# Patient Record
Sex: FEMALE | Race: WHITE | NOT HISPANIC OR LATINO | Employment: FULL TIME | URBAN - METROPOLITAN AREA
[De-identification: names, ages, dates, MRNs, and addresses within clinical notes are randomized per-mention and may not be internally consistent; named-entity substitution may affect disease eponyms.]

---

## 2018-08-26 ENCOUNTER — HOSPITAL ENCOUNTER (INPATIENT)
Facility: HOSPITAL | Age: 63
LOS: 5 days | Discharge: HOME | End: 2018-08-31
Attending: SPECIALIST | Admitting: SPECIALIST

## 2018-08-26 ENCOUNTER — APPOINTMENT (OUTPATIENT)
Dept: GENERAL RADIOLOGY | Facility: HOSPITAL | Age: 63
End: 2018-08-26

## 2018-08-26 ENCOUNTER — HOSPITAL ENCOUNTER (EMERGENCY)
Facility: HOSPITAL | Age: 63
Discharge: PSYCHIATRIC OR SUBSTANCE USE FACILITY | End: 2018-08-26
Attending: EMERGENCY MEDICINE | Admitting: EMERGENCY MEDICINE

## 2018-08-26 ENCOUNTER — APPOINTMENT (OUTPATIENT)
Dept: CT IMAGING | Facility: HOSPITAL | Age: 63
End: 2018-08-26

## 2018-08-26 VITALS
DIASTOLIC BLOOD PRESSURE: 84 MMHG | OXYGEN SATURATION: 98 % | TEMPERATURE: 97.7 F | RESPIRATION RATE: 18 BRPM | SYSTOLIC BLOOD PRESSURE: 136 MMHG | HEART RATE: 83 BPM

## 2018-08-26 DIAGNOSIS — T50.902A INTENTIONAL DRUG OVERDOSE, INITIAL ENCOUNTER (HCC): ICD-10-CM

## 2018-08-26 DIAGNOSIS — S00.83XA CONTUSION OF FACE, INITIAL ENCOUNTER: ICD-10-CM

## 2018-08-26 DIAGNOSIS — S80.02XA CONTUSION OF LEFT KNEE, INITIAL ENCOUNTER: ICD-10-CM

## 2018-08-26 DIAGNOSIS — S63.502A SPRAIN OF LEFT WRIST, INITIAL ENCOUNTER: ICD-10-CM

## 2018-08-26 DIAGNOSIS — S80.01XA CONTUSION OF RIGHT KNEE, INITIAL ENCOUNTER: ICD-10-CM

## 2018-08-26 DIAGNOSIS — R45.851 SUICIDAL IDEATION: Primary | ICD-10-CM

## 2018-08-26 PROBLEM — T14.91XA SUICIDE ATTEMPT (HCC): Status: ACTIVE | Noted: 2018-08-26

## 2018-08-26 LAB
ALBUMIN SERPL-MCNC: 4 G/DL (ref 3.5–5.2)
ALBUMIN/GLOB SERPL: 1.6 G/DL
ALP SERPL-CCNC: 115 U/L (ref 39–117)
ALT SERPL W P-5'-P-CCNC: 12 U/L (ref 1–33)
AMPHET+METHAMPHET UR QL: POSITIVE
ANION GAP SERPL CALCULATED.3IONS-SCNC: 12.9 MMOL/L
APAP SERPL-MCNC: <5 MCG/ML (ref 10–30)
AST SERPL-CCNC: 12 U/L (ref 1–32)
BARBITURATES UR QL SCN: NEGATIVE
BASOPHILS # BLD AUTO: 0.01 10*3/MM3 (ref 0–0.2)
BASOPHILS NFR BLD AUTO: 0.1 % (ref 0–1.5)
BENZODIAZ UR QL SCN: POSITIVE
BILIRUB SERPL-MCNC: 1 MG/DL (ref 0.1–1.2)
BUN BLD-MCNC: 15 MG/DL (ref 8–23)
BUN/CREAT SERPL: 15 (ref 7–25)
CALCIUM SPEC-SCNC: 8.7 MG/DL (ref 8.6–10.5)
CANNABINOIDS SERPL QL: NEGATIVE
CHLORIDE SERPL-SCNC: 105 MMOL/L (ref 98–107)
CK SERPL-CCNC: 132 U/L (ref 20–180)
CO2 SERPL-SCNC: 26.1 MMOL/L (ref 22–29)
COCAINE UR QL: NEGATIVE
CREAT BLD-MCNC: 1 MG/DL (ref 0.57–1)
DEPRECATED RDW RBC AUTO: 42.2 FL (ref 37–54)
EOSINOPHIL # BLD AUTO: 0.04 10*3/MM3 (ref 0–0.7)
EOSINOPHIL NFR BLD AUTO: 0.5 % (ref 0.3–6.2)
ERYTHROCYTE [DISTWIDTH] IN BLOOD BY AUTOMATED COUNT: 14.6 % (ref 11.7–13)
ETHANOL BLD-MCNC: <10 MG/DL (ref 0–10)
ETHANOL UR QL: <0.01 %
GFR SERPL CREATININE-BSD FRML MDRD: 56 ML/MIN/1.73
GFR SERPL CREATININE-BSD FRML MDRD: 68 ML/MIN/1.73
GLOBULIN UR ELPH-MCNC: 2.5 GM/DL
GLUCOSE BLD-MCNC: 104 MG/DL (ref 65–99)
HCT VFR BLD AUTO: 43.2 % (ref 35.6–45.5)
HGB BLD-MCNC: 13.3 G/DL (ref 11.9–15.5)
IMM GRANULOCYTES # BLD: 0.02 10*3/MM3 (ref 0–0.03)
IMM GRANULOCYTES NFR BLD: 0.2 % (ref 0–0.5)
LYMPHOCYTES # BLD AUTO: 1.66 10*3/MM3 (ref 0.9–4.8)
LYMPHOCYTES NFR BLD AUTO: 18.9 % (ref 19.6–45.3)
MCH RBC QN AUTO: 24.6 PG (ref 26.9–32)
MCHC RBC AUTO-ENTMCNC: 30.8 G/DL (ref 32.4–36.3)
MCV RBC AUTO: 79.9 FL (ref 80.5–98.2)
METHADONE UR QL SCN: POSITIVE
MONOCYTES # BLD AUTO: 0.73 10*3/MM3 (ref 0.2–1.2)
MONOCYTES NFR BLD AUTO: 8.3 % (ref 5–12)
NEUTROPHILS # BLD AUTO: 6.36 10*3/MM3 (ref 1.9–8.1)
NEUTROPHILS NFR BLD AUTO: 72.2 % (ref 42.7–76)
NRBC BLD MANUAL-RTO: 0 /100 WBC (ref 0–0)
OPIATES UR QL: NEGATIVE
OXYCODONE UR QL SCN: NEGATIVE
PLATELET # BLD AUTO: 334 10*3/MM3 (ref 140–500)
PMV BLD AUTO: 11.2 FL (ref 6–12)
POTASSIUM BLD-SCNC: 3.3 MMOL/L (ref 3.5–5.2)
PROT SERPL-MCNC: 6.5 G/DL (ref 6–8.5)
RBC # BLD AUTO: 5.41 10*6/MM3 (ref 3.9–5.2)
SALICYLATES SERPL-MCNC: <0.3 MG/DL
SODIUM BLD-SCNC: 144 MMOL/L (ref 136–145)
WBC NRBC COR # BLD: 8.8 10*3/MM3 (ref 4.5–10.7)

## 2018-08-26 PROCEDURE — 80307 DRUG TEST PRSMV CHEM ANLYZR: CPT | Performed by: EMERGENCY MEDICINE

## 2018-08-26 PROCEDURE — 90791 PSYCH DIAGNOSTIC EVALUATION: CPT

## 2018-08-26 PROCEDURE — 82550 ASSAY OF CK (CPK): CPT | Performed by: EMERGENCY MEDICINE

## 2018-08-26 PROCEDURE — 73110 X-RAY EXAM OF WRIST: CPT

## 2018-08-26 PROCEDURE — 70450 CT HEAD/BRAIN W/O DYE: CPT

## 2018-08-26 PROCEDURE — 93010 ELECTROCARDIOGRAM REPORT: CPT | Performed by: INTERNAL MEDICINE

## 2018-08-26 PROCEDURE — 70486 CT MAXILLOFACIAL W/O DYE: CPT

## 2018-08-26 PROCEDURE — 85025 COMPLETE CBC W/AUTO DIFF WBC: CPT | Performed by: EMERGENCY MEDICINE

## 2018-08-26 PROCEDURE — 73562 X-RAY EXAM OF KNEE 3: CPT

## 2018-08-26 PROCEDURE — 93005 ELECTROCARDIOGRAM TRACING: CPT | Performed by: EMERGENCY MEDICINE

## 2018-08-26 PROCEDURE — 99284 EMERGENCY DEPT VISIT MOD MDM: CPT

## 2018-08-26 PROCEDURE — 80053 COMPREHEN METABOLIC PANEL: CPT | Performed by: EMERGENCY MEDICINE

## 2018-08-26 RX ORDER — DEXTROAMPHETAMINE SACCHARATE, AMPHETAMINE ASPARTATE, DEXTROAMPHETAMINE SULFATE AND AMPHETAMINE SULFATE 5; 5; 5; 5 MG/1; MG/1; MG/1; MG/1
30 TABLET ORAL DAILY
Status: ON HOLD | COMMUNITY
End: 2018-08-27

## 2018-08-26 RX ORDER — ROPINIROLE 4 MG/1
4 TABLET, FILM COATED ORAL NIGHTLY
COMMUNITY

## 2018-08-26 RX ORDER — ALUMINA, MAGNESIA, AND SIMETHICONE 2400; 2400; 240 MG/30ML; MG/30ML; MG/30ML
15 SUSPENSION ORAL EVERY 6 HOURS PRN
Status: DISCONTINUED | OUTPATIENT
Start: 2018-08-26 | End: 2018-08-31 | Stop reason: HOSPADM

## 2018-08-26 RX ORDER — BUPROPION HYDROCHLORIDE 150 MG/1
300 TABLET ORAL EVERY MORNING
COMMUNITY
End: 2018-08-31 | Stop reason: HOSPADM

## 2018-08-26 RX ORDER — ACETAMINOPHEN 325 MG/1
650 TABLET ORAL EVERY 4 HOURS PRN
Status: DISCONTINUED | OUTPATIENT
Start: 2018-08-26 | End: 2018-08-31 | Stop reason: HOSPADM

## 2018-08-26 RX ORDER — CLONAZEPAM 0.5 MG/1
0.5 TABLET ORAL 2 TIMES DAILY PRN
COMMUNITY
End: 2018-08-31 | Stop reason: HOSPADM

## 2018-08-26 RX ORDER — BUSPIRONE HYDROCHLORIDE 10 MG/1
20 TABLET ORAL 2 TIMES DAILY
COMMUNITY
End: 2018-08-31 | Stop reason: HOSPADM

## 2018-08-26 RX ORDER — LEVOTHYROXINE SODIUM 0.07 MG/1
75 TABLET ORAL DAILY
COMMUNITY

## 2018-08-26 RX ORDER — ZOLPIDEM TARTRATE 12.5 MG/1
12.5 TABLET, FILM COATED, EXTENDED RELEASE ORAL NIGHTLY
COMMUNITY

## 2018-08-27 LAB
CHOLEST SERPL-MCNC: 148 MG/DL (ref 0–200)
HDLC SERPL-MCNC: 63 MG/DL (ref 40–60)
LDLC SERPL CALC-MCNC: 66 MG/DL (ref 0–100)
LDLC/HDLC SERPL: 1.05 {RATIO}
T4 FREE SERPL-MCNC: 1.38 NG/DL (ref 0.93–1.7)
TRIGL SERPL-MCNC: 94 MG/DL (ref 0–150)
TSH SERPL DL<=0.05 MIU/L-ACNC: 6.52 MIU/ML (ref 0.27–4.2)
VLDLC SERPL-MCNC: 18.8 MG/DL (ref 5–40)

## 2018-08-27 PROCEDURE — 84439 ASSAY OF FREE THYROXINE: CPT | Performed by: SPECIALIST

## 2018-08-27 PROCEDURE — 84443 ASSAY THYROID STIM HORMONE: CPT | Performed by: SPECIALIST

## 2018-08-27 PROCEDURE — 80061 LIPID PANEL: CPT | Performed by: SPECIALIST

## 2018-08-27 RX ORDER — MELATONIN
2000 DAILY
Status: DISCONTINUED | OUTPATIENT
Start: 2018-08-27 | End: 2018-08-31 | Stop reason: HOSPADM

## 2018-08-27 RX ORDER — DEXTROAMPHETAMINE SACCHARATE, AMPHETAMINE ASPARTATE MONOHYDRATE, DEXTROAMPHETAMINE SULFATE AND AMPHETAMINE SULFATE 2.5; 2.5; 2.5; 2.5 MG/1; MG/1; MG/1; MG/1
30 CAPSULE, EXTENDED RELEASE ORAL DAILY
Status: DISCONTINUED | OUTPATIENT
Start: 2018-08-27 | End: 2018-08-27 | Stop reason: DRUGHIGH

## 2018-08-27 RX ORDER — BUSPIRONE HYDROCHLORIDE 10 MG/1
20 TABLET ORAL 2 TIMES DAILY
Status: DISCONTINUED | OUTPATIENT
Start: 2018-08-27 | End: 2018-08-31 | Stop reason: HOSPADM

## 2018-08-27 RX ORDER — DEXTROAMPHETAMINE SACCHARATE, AMPHETAMINE ASPARTATE, DEXTROAMPHETAMINE SULFATE AND AMPHETAMINE SULFATE 1.25; 1.25; 1.25; 1.25 MG/1; MG/1; MG/1; MG/1
20 TABLET ORAL
Status: DISCONTINUED | OUTPATIENT
Start: 2018-08-27 | End: 2018-08-28

## 2018-08-27 RX ORDER — CLONAZEPAM 0.5 MG/1
0.5 TABLET ORAL 2 TIMES DAILY PRN
Status: DISCONTINUED | OUTPATIENT
Start: 2018-08-27 | End: 2018-08-31 | Stop reason: HOSPADM

## 2018-08-27 RX ORDER — ROPINIROLE 2 MG/1
4 TABLET, FILM COATED ORAL NIGHTLY
Status: DISCONTINUED | OUTPATIENT
Start: 2018-08-27 | End: 2018-08-31 | Stop reason: HOSPADM

## 2018-08-27 RX ORDER — LEVOTHYROXINE SODIUM 0.07 MG/1
75 TABLET ORAL
Status: DISCONTINUED | OUTPATIENT
Start: 2018-08-27 | End: 2018-08-31 | Stop reason: HOSPADM

## 2018-08-27 RX ORDER — DEXTROAMPHETAMINE SACCHARATE, AMPHETAMINE ASPARTATE MONOHYDRATE, DEXTROAMPHETAMINE SULFATE AND AMPHETAMINE SULFATE 5; 5; 5; 5 MG/1; MG/1; MG/1; MG/1
40 CAPSULE, EXTENDED RELEASE ORAL EVERY MORNING
COMMUNITY

## 2018-08-27 RX ORDER — ZOLPIDEM TARTRATE 5 MG/1
5 TABLET ORAL NIGHTLY
Status: DISCONTINUED | OUTPATIENT
Start: 2018-08-27 | End: 2018-08-28

## 2018-08-27 RX ORDER — BUPROPION HYDROCHLORIDE 150 MG/1
150 TABLET ORAL 3 TIMES DAILY
Status: DISCONTINUED | OUTPATIENT
Start: 2018-08-27 | End: 2018-08-27

## 2018-08-27 RX ADMIN — VORTIOXETINE 20 MG: 10 TABLET, FILM COATED ORAL at 14:11

## 2018-08-27 RX ADMIN — BUPROPION HYDROCHLORIDE 150 MG: 150 TABLET, FILM COATED, EXTENDED RELEASE ORAL at 08:47

## 2018-08-27 RX ADMIN — LEVOTHYROXINE SODIUM 75 MCG: 75 TABLET ORAL at 08:47

## 2018-08-27 RX ADMIN — VITAMIN D, TAB 1000IU (100/BT) 2000 UNITS: 25 TAB at 08:46

## 2018-08-27 RX ADMIN — CLONAZEPAM 0.5 MG: 0.5 TABLET ORAL at 22:13

## 2018-08-27 RX ADMIN — BUSPIRONE HYDROCHLORIDE 20 MG: 10 TABLET ORAL at 08:46

## 2018-08-27 RX ADMIN — ACETAMINOPHEN 650 MG: 325 TABLET ORAL at 04:11

## 2018-08-27 RX ADMIN — ACETAMINOPHEN 650 MG: 325 TABLET ORAL at 22:14

## 2018-08-27 RX ADMIN — DEXTROAMPHETAMINE SACCHARATE, AMPHETAMINE ASPARTATE, DEXTROAMPHETAMINE SULFATE AND AMPHETAMINE SULFATE 20 MG: 1.25; 1.25; 1.25; 1.25 TABLET ORAL at 14:11

## 2018-08-27 RX ADMIN — BUSPIRONE HYDROCHLORIDE 20 MG: 10 TABLET ORAL at 22:12

## 2018-08-27 RX ADMIN — CLONAZEPAM 0.5 MG: 0.5 TABLET ORAL at 04:12

## 2018-08-27 RX ADMIN — ROPINIROLE 4 MG: 2 TABLET, FILM COATED ORAL at 22:13

## 2018-08-27 RX ADMIN — ZOLPIDEM TARTRATE 5 MG: 5 TABLET ORAL at 22:13

## 2018-08-28 LAB
BACTERIA UR QL AUTO: ABNORMAL /HPF
BILIRUB UR QL STRIP: NEGATIVE
CLARITY UR: CLEAR
COLOR UR: YELLOW
GLUCOSE UR STRIP-MCNC: NEGATIVE MG/DL
HGB UR QL STRIP.AUTO: NEGATIVE
HYALINE CASTS UR QL AUTO: ABNORMAL /LPF
KETONES UR QL STRIP: NEGATIVE
LEUKOCYTE ESTERASE UR QL STRIP.AUTO: ABNORMAL
NITRITE UR QL STRIP: NEGATIVE
PH UR STRIP.AUTO: 5.5 [PH] (ref 5–8)
PROT UR QL STRIP: NEGATIVE
RBC # UR: ABNORMAL /HPF
REF LAB TEST METHOD: ABNORMAL
SP GR UR STRIP: 1.01 (ref 1–1.03)
SQUAMOUS #/AREA URNS HPF: ABNORMAL /HPF
UROBILINOGEN UR QL STRIP: ABNORMAL
WBC UR QL AUTO: ABNORMAL /HPF

## 2018-08-28 PROCEDURE — 81001 URINALYSIS AUTO W/SCOPE: CPT | Performed by: SPECIALIST

## 2018-08-28 RX ORDER — NAPROXEN 250 MG/1
375 TABLET ORAL 2 TIMES DAILY WITH MEALS
Status: DISCONTINUED | OUTPATIENT
Start: 2018-08-28 | End: 2018-08-29

## 2018-08-28 RX ORDER — ZOLPIDEM TARTRATE 5 MG/1
10 TABLET ORAL NIGHTLY
Status: DISCONTINUED | OUTPATIENT
Start: 2018-08-28 | End: 2018-08-31 | Stop reason: HOSPADM

## 2018-08-28 RX ORDER — DEXTROAMPHETAMINE SACCHARATE, AMPHETAMINE ASPARTATE MONOHYDRATE, DEXTROAMPHETAMINE SULFATE AND AMPHETAMINE SULFATE 5; 5; 5; 5 MG/1; MG/1; MG/1; MG/1
40 CAPSULE, EXTENDED RELEASE ORAL DAILY
Status: DISCONTINUED | OUTPATIENT
Start: 2018-08-29 | End: 2018-08-31 | Stop reason: HOSPADM

## 2018-08-28 RX ADMIN — NAPROXEN 375 MG: 250 TABLET ORAL at 12:16

## 2018-08-28 RX ADMIN — LEVOTHYROXINE SODIUM 75 MCG: 75 TABLET ORAL at 08:09

## 2018-08-28 RX ADMIN — ZOLPIDEM TARTRATE 10 MG: 5 TABLET ORAL at 22:36

## 2018-08-28 RX ADMIN — ACETAMINOPHEN 650 MG: 325 TABLET ORAL at 15:35

## 2018-08-28 RX ADMIN — BUPROPION HYDROCHLORIDE 450 MG: 300 TABLET, EXTENDED RELEASE ORAL at 08:09

## 2018-08-28 RX ADMIN — ACETAMINOPHEN 650 MG: 325 TABLET ORAL at 02:15

## 2018-08-28 RX ADMIN — BUSPIRONE HYDROCHLORIDE 20 MG: 10 TABLET ORAL at 08:09

## 2018-08-28 RX ADMIN — BUSPIRONE HYDROCHLORIDE 20 MG: 10 TABLET ORAL at 22:36

## 2018-08-28 RX ADMIN — ROPINIROLE 4 MG: 2 TABLET, FILM COATED ORAL at 22:36

## 2018-08-28 RX ADMIN — VITAMIN D, TAB 1000IU (100/BT) 2000 UNITS: 25 TAB at 08:09

## 2018-08-28 RX ADMIN — NAPROXEN 375 MG: 250 TABLET ORAL at 17:09

## 2018-08-28 RX ADMIN — CLONAZEPAM 0.5 MG: 0.5 TABLET ORAL at 05:08

## 2018-08-28 RX ADMIN — VORTIOXETINE 20 MG: 10 TABLET, FILM COATED ORAL at 08:09

## 2018-08-29 RX ORDER — IBUPROFEN 400 MG/1
400 TABLET ORAL EVERY 4 HOURS PRN
Status: DISCONTINUED | OUTPATIENT
Start: 2018-08-29 | End: 2018-08-31 | Stop reason: HOSPADM

## 2018-08-29 RX ADMIN — VITAMIN D, TAB 1000IU (100/BT) 2000 UNITS: 25 TAB at 08:31

## 2018-08-29 RX ADMIN — BUSPIRONE HYDROCHLORIDE 20 MG: 10 TABLET ORAL at 21:15

## 2018-08-29 RX ADMIN — NAPROXEN 375 MG: 250 TABLET ORAL at 08:31

## 2018-08-29 RX ADMIN — ZOLPIDEM TARTRATE 10 MG: 5 TABLET ORAL at 21:15

## 2018-08-29 RX ADMIN — VORTIOXETINE 20 MG: 10 TABLET, FILM COATED ORAL at 08:31

## 2018-08-29 RX ADMIN — ROPINIROLE 4 MG: 2 TABLET, FILM COATED ORAL at 21:15

## 2018-08-29 RX ADMIN — ACETAMINOPHEN 650 MG: 325 TABLET ORAL at 04:05

## 2018-08-29 RX ADMIN — BUSPIRONE HYDROCHLORIDE 20 MG: 10 TABLET ORAL at 08:32

## 2018-08-29 RX ADMIN — DEXTROAMPHETAMINE SACCHARATE, AMPHETAMINE ASPARTATE MONOHYDRATE, DEXTROAMPHETAMINE SULFATE AND AMPHETAMINE SULFATE 40 MG: 5; 5; 5; 5 CAPSULE, EXTENDED RELEASE ORAL at 08:31

## 2018-08-29 RX ADMIN — CLONAZEPAM 0.5 MG: 0.5 TABLET ORAL at 21:26

## 2018-08-29 RX ADMIN — BUPROPION HYDROCHLORIDE 450 MG: 300 TABLET, EXTENDED RELEASE ORAL at 08:31

## 2018-08-29 RX ADMIN — LEVOTHYROXINE SODIUM 75 MCG: 75 TABLET ORAL at 10:57

## 2018-08-30 LAB — TROPONIN T SERPL-MCNC: <0.01 NG/ML (ref 0–0.03)

## 2018-08-30 PROCEDURE — 84484 ASSAY OF TROPONIN QUANT: CPT | Performed by: SPECIALIST

## 2018-08-30 PROCEDURE — 93010 ELECTROCARDIOGRAM REPORT: CPT | Performed by: INTERNAL MEDICINE

## 2018-08-30 PROCEDURE — 93005 ELECTROCARDIOGRAM TRACING: CPT | Performed by: SPECIALIST

## 2018-08-30 RX ORDER — DIPHENOXYLATE HYDROCHLORIDE AND ATROPINE SULFATE 2.5; .025 MG/1; MG/1
1 TABLET ORAL DAILY
Status: DISCONTINUED | OUTPATIENT
Start: 2018-08-30 | End: 2018-08-31 | Stop reason: HOSPADM

## 2018-08-30 RX ADMIN — DEXTROAMPHETAMINE SACCHARATE, AMPHETAMINE ASPARTATE MONOHYDRATE, DEXTROAMPHETAMINE SULFATE AND AMPHETAMINE SULFATE 40 MG: 5; 5; 5; 5 CAPSULE, EXTENDED RELEASE ORAL at 09:02

## 2018-08-30 RX ADMIN — IBUPROFEN 400 MG: 400 TABLET ORAL at 09:07

## 2018-08-30 RX ADMIN — BUPROPION HYDROCHLORIDE 450 MG: 300 TABLET, EXTENDED RELEASE ORAL at 09:03

## 2018-08-30 RX ADMIN — IBUPROFEN 400 MG: 400 TABLET ORAL at 21:38

## 2018-08-30 RX ADMIN — CLONAZEPAM 0.5 MG: 0.5 TABLET ORAL at 21:44

## 2018-08-30 RX ADMIN — VORTIOXETINE 20 MG: 10 TABLET, FILM COATED ORAL at 09:03

## 2018-08-30 RX ADMIN — BUSPIRONE HYDROCHLORIDE 20 MG: 10 TABLET ORAL at 09:03

## 2018-08-30 RX ADMIN — VITAMIN D, TAB 1000IU (100/BT) 2000 UNITS: 25 TAB at 09:03

## 2018-08-30 RX ADMIN — ACETAMINOPHEN 650 MG: 325 TABLET ORAL at 00:13

## 2018-08-30 RX ADMIN — ROPINIROLE 4 MG: 2 TABLET, FILM COATED ORAL at 21:38

## 2018-08-30 RX ADMIN — IBUPROFEN 400 MG: 400 TABLET ORAL at 13:41

## 2018-08-30 RX ADMIN — ZOLPIDEM TARTRATE 10 MG: 5 TABLET ORAL at 21:38

## 2018-08-30 RX ADMIN — CLONAZEPAM 0.5 MG: 0.5 TABLET ORAL at 09:08

## 2018-08-30 RX ADMIN — BUSPIRONE HYDROCHLORIDE 20 MG: 10 TABLET ORAL at 21:38

## 2018-08-30 RX ADMIN — LEVOTHYROXINE SODIUM 75 MCG: 75 TABLET ORAL at 11:00

## 2018-08-30 RX ADMIN — Medication 1 TABLET: at 12:43

## 2018-08-31 VITALS
HEART RATE: 97 BPM | DIASTOLIC BLOOD PRESSURE: 85 MMHG | OXYGEN SATURATION: 95 % | TEMPERATURE: 97.5 F | RESPIRATION RATE: 18 BRPM | SYSTOLIC BLOOD PRESSURE: 155 MMHG

## 2018-08-31 PROBLEM — R07.9 CHEST PAIN: Status: ACTIVE | Noted: 2018-08-31

## 2018-08-31 LAB — TROPONIN T SERPL-MCNC: <0.01 NG/ML (ref 0–0.03)

## 2018-08-31 PROCEDURE — 84484 ASSAY OF TROPONIN QUANT: CPT | Performed by: HOSPITALIST

## 2018-08-31 RX ORDER — CLONAZEPAM 0.5 MG/1
0.5 TABLET ORAL 2 TIMES DAILY PRN
Qty: 60 TABLET | Refills: 1 | Status: SHIPPED | OUTPATIENT
Start: 2018-08-31 | End: 2018-09-06

## 2018-08-31 RX ORDER — BUPROPION HYDROCHLORIDE 450 MG/1
450 TABLET, FILM COATED, EXTENDED RELEASE ORAL DAILY
Qty: 30 TABLET | Refills: 1 | Status: SHIPPED | OUTPATIENT
Start: 2018-09-01

## 2018-08-31 RX ORDER — BUSPIRONE HYDROCHLORIDE 10 MG/1
20 TABLET ORAL 2 TIMES DAILY
Qty: 60 TABLET | Refills: 1 | Status: SHIPPED | OUTPATIENT
Start: 2018-08-31

## 2018-08-31 RX ADMIN — VORTIOXETINE 20 MG: 10 TABLET, FILM COATED ORAL at 08:33

## 2018-08-31 RX ADMIN — BUPROPION HYDROCHLORIDE 450 MG: 300 TABLET, EXTENDED RELEASE ORAL at 08:33

## 2018-08-31 RX ADMIN — Medication 1 TABLET: at 08:33

## 2018-08-31 RX ADMIN — CLONAZEPAM 0.5 MG: 0.5 TABLET ORAL at 03:23

## 2018-08-31 RX ADMIN — BUSPIRONE HYDROCHLORIDE 20 MG: 10 TABLET ORAL at 08:33

## 2018-08-31 RX ADMIN — VITAMIN D, TAB 1000IU (100/BT) 2000 UNITS: 25 TAB at 08:33

## 2018-08-31 RX ADMIN — ACETAMINOPHEN 650 MG: 325 TABLET ORAL at 03:23

## 2018-08-31 RX ADMIN — DEXTROAMPHETAMINE SACCHARATE, AMPHETAMINE ASPARTATE MONOHYDRATE, DEXTROAMPHETAMINE SULFATE AND AMPHETAMINE SULFATE 40 MG: 5; 5; 5; 5 CAPSULE, EXTENDED RELEASE ORAL at 11:06

## 2018-09-04 ENCOUNTER — OFFICE VISIT (OUTPATIENT)
Dept: PSYCHIATRY | Facility: HOSPITAL | Age: 63
End: 2018-09-04

## 2018-09-04 DIAGNOSIS — F33.2 SEVERE EPISODE OF RECURRENT MAJOR DEPRESSIVE DISORDER, WITHOUT PSYCHOTIC FEATURES (HCC): Primary | ICD-10-CM

## 2018-09-04 PROCEDURE — S9480 INTENSIVE OUTPATIENT PSYCHIA: HCPCS | Performed by: COUNSELOR

## 2018-09-04 NOTE — PROGRESS NOTES
"Group therapy 3713-2109  Pt was new to the group.  Shared extensively of issues with her mother, two husbands and feelings of abandonment.   In touch with much grief especially her home and thinks she will need to get rid of her three pets   \"I can't even take care of myself and I need to find a home for them.\"   Was very attentive and related as peers shared.      "

## 2018-09-04 NOTE — PROGRESS NOTES
Wrap-up  Mood at 3-4 with 10 being the worse  Found sharing in group to be most helpful today    Feeling sad or empty   Trouble with concentration, memory, or making decisions  Fatigue or loss of energy  Loss of interest in things you usually like to do  Easily tearing up/crying  Easily distracted  An increase/decrease in normal appetite  Feelings of worthlessness  Feelings of increased guilt  Increased nervous feelings/anxiety  Racing thoughts  No SI  Good participation in classes and group  Appropriate goals for later today

## 2018-09-05 ENCOUNTER — OFFICE VISIT (OUTPATIENT)
Dept: PSYCHIATRY | Facility: HOSPITAL | Age: 63
End: 2018-09-05

## 2018-09-05 DIAGNOSIS — F33.2 SEVERE EPISODE OF RECURRENT MAJOR DEPRESSIVE DISORDER, WITHOUT PSYCHOTIC FEATURES (HCC): Primary | ICD-10-CM

## 2018-09-05 PROCEDURE — S9480 INTENSIVE OUTPATIENT PSYCHIA: HCPCS | Performed by: COUNSELOR

## 2018-09-05 NOTE — PROGRESS NOTES
Wrap-up  Mood at 4 with 10 being the worse  Found group therapy to be most helpful today    Feeling sad or empty   Fatigue or loss of energy  Loss of interest in things you usually like to do  Increased nervous feelings/anxiety  Racing thoughts  No SI  Appropriate goals for later today

## 2018-09-05 NOTE — PROGRESS NOTES
Group therapy   Reported went to work and turned in [place] paperwork and felt a sense of relief.  Pt tearful as she shared of several losses including marriage, two homes and pets.  Stated feels somewhat better today.  Pt and  are cordial as they work together on packing their home in preparation for selling it.  Pt was attentive and engaged in the group process.  No SI

## 2018-09-05 NOTE — TREATMENT PLAN
Multi-Disciplinary Problems (from Behavioral Health Treatment Plan)    Active Problems     Problem: Depression  Start Date: 09/05/18    Problem Details:  The patient self-scales this problem as a 6 with 10 being the worst.       Goal Priority Start Date Expected End Date End Date    Patient will demonstrate the ability to initiate new constructive life skills outside of sessions on a consistent basis. -- 09/05/18 -- --    Goal Details:  Progress toward goal:  Not appropriate to rate progress toward goal since this is the initial treatment plan.       Goal Intervention Frequency Start Date End Date    Assist patient in setting attainable activities of daily living goals. PRN 09/05/18 --    Goal Intervention Frequency Start Date End Date    Provide education about depression Weekly 09/05/18 --    Intervention Details:  Duration of treatment until until discharged.       Goal Intervention Frequency Start Date End Date    Assist patient in developing healthy coping strategies. Weekly 09/05/18 --    Intervention Details:  Duration of treatment until until discharged.             Problem: Grief and Loss  Start Date: 09/05/18    Problem Details:  The patient self-scales this problem as a 7 with 10 being the worst.       Goal Priority Start Date Expected End Date End Date    Patient will process feelings and identify and implement specific ways to facilitate the grieving process. -- 09/05/18 -- --    Goal Details:  Progress toward goal:  Not appropriate to rate progress toward goal since this is the initial treatment plan.       Goal Intervention Frequency Start Date End Date    Assist patient in identifying and processing feelings about losses. Weekly 09/05/18 --    Intervention Details:  Duration of treatment until until discharged.       Goal Intervention Frequency Start Date End Date    Identify ways to grieve effectively and utilize healthy support systems Weekly 09/05/18 --    Intervention Details:  Duration of  treatment until until discharged.                          I have discussed and reviewed this treatment plan with the patient.  It has been printed for signatures.

## 2018-09-05 NOTE — PROGRESS NOTES
Other     Information/activity     0900-1000 - Art Therapy - Change of Perspective task with markers    Instructor Jorje Vogel, LPAT     2:34 PM     Patient Response Good participation

## 2018-09-05 NOTE — PLAN OF CARE
Problem: BH Patient Care Overview (Adult)  Goal: Interdisciplinary Rounds/Family Conference  Outcome: Ongoing (interventions implemented as appropriate)   09/05/18 0959   Interdisciplinary Rounds/Family Conf   Participants social work     Pt has attended 2 days of IOP dealing with depression and loss issues.  Describes impending divorce, loss of home and work as primary stressors.  Very active in classes and groups.  Encouraging expression of feelings regarding loss, setting of boundaries and focus more on what she can control.  No SI.  She will follow up with Dr. Marky Thakkar and a therapist in his building.

## 2018-09-06 ENCOUNTER — OFFICE VISIT (OUTPATIENT)
Dept: PSYCHIATRY | Facility: HOSPITAL | Age: 63
End: 2018-09-06

## 2018-09-06 DIAGNOSIS — F33.2 SEVERE EPISODE OF RECURRENT MAJOR DEPRESSIVE DISORDER, WITHOUT PSYCHOTIC FEATURES (HCC): Primary | ICD-10-CM

## 2018-09-06 PROCEDURE — S9480 INTENSIVE OUTPATIENT PSYCHIA: HCPCS | Performed by: SOCIAL WORKER

## 2018-09-06 NOTE — PROGRESS NOTES
Wrap up:  Patient rates her mood at a 3 and found group helpful.  Symptoms include fatigue, changes in sleep, easily tearing up, and excessive involvement-she talked about spending time on line.  No SI.  Patient asked about providers in [place].  Suggested calling her insurance company for referrals.

## 2018-09-06 NOTE — PROGRESS NOTES
The patient appears greatly improved today.  She is brighter and more future oriented.  She states that she plans to move to [place] to live with her brother at the end of the month.  She is denying suicidal thinking and is handling her marital situation well.

## 2018-09-06 NOTE — PROGRESS NOTES
[id number] Psych IOP Class     Class topic: communication/assertiveness     Class participation: pt actively listening and engaged in topic; demonstrated insight and awareness; shared much of personal experience and struggle

## 2018-09-07 ENCOUNTER — OFFICE VISIT (OUTPATIENT)
Dept: PSYCHIATRY | Facility: HOSPITAL | Age: 63
End: 2018-09-07

## 2018-09-07 DIAGNOSIS — F33.2 SEVERE EPISODE OF RECURRENT MAJOR DEPRESSIVE DISORDER, WITHOUT PSYCHOTIC FEATURES (HCC): Primary | ICD-10-CM

## 2018-09-07 PROCEDURE — S9480 INTENSIVE OUTPATIENT PSYCHIA: HCPCS | Performed by: SOCIAL WORKER

## 2018-09-07 NOTE — PROGRESS NOTES
Teaching Record: Psych IOP Class  Information / activity:  Understanding and Coping with Anxiety    Frequently interrupted peers to discuss personal experiences not related to topic      Julieta Cooney, LCSW

## 2018-09-07 NOTE — PROGRESS NOTES
Mood at 4 with 10 being the worse and did find being reminded to breathe and focus helpful.  Symptoms reported:    Feeling sad or empty   Easily tearing up/crying  Changes in normal sleep patterns (increase, decrease, or broken hoours of sleep)  Increased nervous feelings/anxiety     No SI.  Patient did find a [facility]BS[facility] group close to where she will be moving.  Encouraging her to find providers there as well.

## 2018-09-07 NOTE — PROGRESS NOTES
10:15am-11:30am  Group note:  Patient was having a difficult time staying on topic.  She interrupted other patients with input unrelated to the discussion.  Numerous attempts at redirection were unsuccessful as she seems to have little insight.  Encouraged her to practice staying in the present as she ruminates about the past and is fearful of the future.  The group tried to be supportive but it was difficult for her to receive feedback.

## 2018-09-10 ENCOUNTER — OFFICE VISIT (OUTPATIENT)
Dept: PSYCHIATRY | Facility: HOSPITAL | Age: 63
End: 2018-09-10

## 2018-09-10 DIAGNOSIS — F33.2 SEVERE EPISODE OF RECURRENT MAJOR DEPRESSIVE DISORDER, WITHOUT PSYCHOTIC FEATURES (HCC): Primary | ICD-10-CM

## 2018-09-10 PROCEDURE — S9480 INTENSIVE OUTPATIENT PSYCHIA: HCPCS | Performed by: COUNSELOR

## 2018-09-10 NOTE — PROGRESS NOTES
Teaching Record: Psych IOP Class  Information / activity:  Self Compassion    Good participation, less disruptive and shared personal experiences related to topic      Julieta Cooney, LCSW

## 2018-09-10 NOTE — PROGRESS NOTES
Wrap-up  Mood at 3  with 10 being the worse  Found class on self-compassion to be most helpful.    Trouble with concentration, memory, or making decisions  Fatigue or loss of energy  Easily tearing up/crying  A decrease in sleep  Increased nervous feelings/anxiety  No SI  Reported feels better today.  Appropriate goals for later today

## 2018-09-10 NOTE — PROGRESS NOTES
Group therapy   Pt was rather quiet as others shared and listened attentively. Pt did acknowleged she identified with a peer dealing with grief issues.   We were running out of time and when given the option of being brief or going first tomorrow, pt chose to go first tomorrow.  She stated she had a lot she wanted to share.

## 2018-09-11 ENCOUNTER — OFFICE VISIT (OUTPATIENT)
Dept: PSYCHIATRY | Facility: HOSPITAL | Age: 63
End: 2018-09-11

## 2018-09-11 DIAGNOSIS — F33.2 SEVERE EPISODE OF RECURRENT MAJOR DEPRESSIVE DISORDER, WITHOUT PSYCHOTIC FEATURES (HCC): Primary | ICD-10-CM

## 2018-09-11 PROCEDURE — S9480 INTENSIVE OUTPATIENT PSYCHIA: HCPCS | Performed by: COUNSELOR
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
## 2018-09-11 NOTE — PLAN OF CARE
Problem:  Patient Care Overview (Adult)  Goal: Interdisciplinary Rounds/Family Conference  Outcome: Ongoing (interventions implemented as appropriate)   09/11/18 1317   Interdisciplinary Rounds/Family Conf   Participants social work     Pt has attended six IOP sessions dealing with severe depression and grief/loss issues.  Continues to report numerous depressive symptoms but states feeling somewhat better overall.  Dr. Kiran following in IOP for medication management.  Pt is practicing mindfulness and working on more positive self-talk as coping skills.  Good expression of grief in classes and groups.  Somewhat verbose but can be redirected.  No SI.  Pt moving to [place] And is exploring support groups and OP providers there.

## 2018-09-11 NOTE — PROGRESS NOTES
Wrap-up  Mood at 2 with 10 being the worse    Fatigue or loss of energy  Changes in normal sleep patterns (decrease, or broken hoours of sleep)  No SI  Good participation in classes and group  Accomplishing daily goals  Very engaged with peers  Appropriate goals for later today

## 2018-09-11 NOTE — PROGRESS NOTES
Mental Health Awareness Class   (10:30am-11:30am)  Information/activity     FAQs    Instructor Annie Robles LCSW     11:38 AM     Patient Response Good participation

## 2018-09-12 ENCOUNTER — OFFICE VISIT (OUTPATIENT)
Dept: PSYCHIATRY | Facility: HOSPITAL | Age: 63
End: 2018-09-12

## 2018-09-12 DIAGNOSIS — F33.2 SEVERE EPISODE OF RECURRENT MAJOR DEPRESSIVE DISORDER, WITHOUT PSYCHOTIC FEATURES (HCC): Primary | ICD-10-CM

## 2018-09-12 PROCEDURE — S9480 INTENSIVE OUTPATIENT PSYCHIA: HCPCS | Performed by: COUNSELOR

## 2018-09-12 NOTE — PROGRESS NOTES
Other     Information/activity     7124-2401 Art Therapy - The Morning Glory and the Storm therapeutic story and watercolor expression    Instructor Jorje Vogel, LPAT     4:34 PM     Patient Response Good participation

## 2018-09-12 NOTE — PROGRESS NOTES
"Group therapy   Please to report had a good day yesterday:  To get meds filled, packed, listened to mindfulness.  Reported feeling better about the move and \"moving forward with my life\"  Bright affect.  No SI.  Attentive and interjected appropriately as peers shared.  "

## 2018-09-12 NOTE — PROGRESS NOTES
Wrap-up  Mood at 1  with 10 being the worse    Found talking and sharing as most helpful today    Continues with decreased sleep  Fatigue  No SI  Brighter affect  Appropriate goals for later today

## 2018-09-13 ENCOUNTER — OFFICE VISIT (OUTPATIENT)
Dept: PSYCHIATRY | Facility: HOSPITAL | Age: 63
End: 2018-09-13

## 2018-09-13 DIAGNOSIS — F33.2 SEVERE EPISODE OF RECURRENT MAJOR DEPRESSIVE DISORDER, WITHOUT PSYCHOTIC FEATURES (HCC): Primary | ICD-10-CM

## 2018-09-13 PROCEDURE — S9480 INTENSIVE OUTPATIENT PSYCHIA: HCPCS | Performed by: SOCIAL WORKER

## 2018-09-13 NOTE — PROGRESS NOTES
Teaching Record:  Psych IOP Class  Information / activity:  Boundaries    Good participation, shared personal experience      Julieta Cooney, [facility]

## 2018-09-13 NOTE — PROGRESS NOTES
10:15am-11:30am  Group note:  Patient tearful, states she did not sleep last night.  She did participate in group but not as much as she has been.  Reported a good day the day before but feeling more hopeless today

## 2018-09-13 NOTE — PROGRESS NOTES
"Mood at 9 with 10 being the worse and found \"just being here\"  helpful.  Symptoms include:    Feeling sad or empty   Fatigue or loss of energy  Easily tearing up/crying  Increased Irritabliliy  Changes in normal sleep patterns (increase, decrease, or broken hoours of sleep)  Increased nervous feelings/anxiety  Racing thoughts     She reports thoughts of self harm but with no plan or intent.  Hopes to get rest tonight and see the doctor tomorrow.    "

## 2018-09-14 ENCOUNTER — OFFICE VISIT (OUTPATIENT)
Dept: PSYCHIATRY | Facility: HOSPITAL | Age: 63
End: 2018-09-14

## 2018-09-14 DIAGNOSIS — F33.2 SEVERE EPISODE OF RECURRENT MAJOR DEPRESSIVE DISORDER, WITHOUT PSYCHOTIC FEATURES (HCC): Primary | ICD-10-CM

## 2018-09-14 PROCEDURE — S9480 INTENSIVE OUTPATIENT PSYCHIA: HCPCS | Performed by: SOCIAL WORKER

## 2018-09-14 NOTE — PROGRESS NOTES
Teaching Record:  Information / activity:  Co-Occurring Disorders in Adults; video and discussion about MI relapse prevention    Good participation, engaged and shared personal experiences      Julieta Cooney, [facility]

## 2018-09-14 NOTE — PROGRESS NOTES
Wrap up:  Patient rates her mood at a 7, found the am program helpful.  Reports increased nervousness and racing thoughts.  No SI.  Appropriate goals.
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
## 2018-09-14 NOTE — PROGRESS NOTES
The patient complains today that she has lost her Klonopin prescription bottle and is now realized that she overdosed on Ambien and assessment of that medication.  I explained to the patient that I cannot refill her Klonopin early and have suggested that she make a better effort to find that medication in her home.  I will substitute trazodone 50 mg at bedtime for Ambien and will provide a prescription for that medication.  The patient reports apprehension regarding her upcoming move to [place] but is denying any suicidal thinking.

## 2018-09-17 ENCOUNTER — OFFICE VISIT (OUTPATIENT)
Dept: PSYCHIATRY | Facility: HOSPITAL | Age: 63
End: 2018-09-17

## 2018-09-17 DIAGNOSIS — F33.2 SEVERE EPISODE OF RECURRENT MAJOR DEPRESSIVE DISORDER, WITHOUT PSYCHOTIC FEATURES (HCC): Primary | ICD-10-CM

## 2018-09-17 PROCEDURE — S9480 INTENSIVE OUTPATIENT PSYCHIA: HCPCS | Performed by: COUNSELOR

## 2018-09-17 NOTE — PROGRESS NOTES
Wrap-up  Mood at 6  with 10 being the worse    Trouble with concentration, memory, or making decisions  Loss of interest in things you usually like to do  Easily tearing up/crying  Changes in normal sleep patterns (increase, decrease, or broken hoours of sleep)  Racing thoughts  No SI  Was quiet and attentive as peers shared.  Appropriate goals for later today

## 2018-09-17 NOTE — PROGRESS NOTES
Teaching Record: Psych IOP Class  Information / activity:  Feeling Wheel    Good participation, shared personal experience and related well to peers      Julieta Cooney, [facility]

## 2018-09-17 NOTE — PROGRESS NOTES
Group therapy   Shared of several positives from the weekend--read, watched movies, attended party thrown by daughter and spent several hours packing.  Pt also shared of got Trazodone filled and took it one night--forgot she took it in the past and was allergic to it.  Inquired about other OTC drugs which might help and pt was encouraged to talk with her pharmacist for suggestions until she sees Dr. Kiran later this week.  Pt was appreciative of the suggestion and will call pharmacy.  Pt was quiet but attentive as peers shared.

## 2018-09-18 ENCOUNTER — OFFICE VISIT (OUTPATIENT)
Dept: PSYCHIATRY | Facility: HOSPITAL | Age: 63
End: 2018-09-18

## 2018-09-18 DIAGNOSIS — F33.2 SEVERE EPISODE OF RECURRENT MAJOR DEPRESSIVE DISORDER, WITHOUT PSYCHOTIC FEATURES (HCC): Primary | ICD-10-CM

## 2018-09-18 PROCEDURE — S9480 INTENSIVE OUTPATIENT PSYCHIA: HCPCS | Performed by: COUNSELOR

## 2018-09-18 NOTE — PROGRESS NOTES
Other     Information/activity     [id number] [facility] Watercolor and processing    Instructor Jorje Vogel, ERIKAT     4:25 PM     Patient Response Moderate participation Pt left twice and did not process the experience

## 2018-09-18 NOTE — PROGRESS NOTES
Wrap-up  Mood at 7 with 10 being the worse  Reported just talking to others as most helpful today    Feeling sad or empty   Easily tearing up/crying  Easily distracted  Changes in normal sleep patterns (i decrease sleep)  Increased nervous feelings/anxiety  Racing thoughts  Worried she has run out of Ambien and might not sleep well tonight  Set appropriate goals for later today

## 2018-09-18 NOTE — PROGRESS NOTES
Group therapy   Pt quiet and attentive as peers shared.  Was able to wait until last to talk about feeling unwanted by her daughter.  Group explored options for pt to deal with this.  Pt not wanting to talk to daughter, may journal  And practice other coping skills to cope.  Focusing on worry about not sleeping since she ran out of Ambien last night and pt reported she may just take the Trazodone tonight until she can talk with the doctor about any other possible sleep aids.

## 2018-09-19 ENCOUNTER — OFFICE VISIT (OUTPATIENT)
Dept: PSYCHIATRY | Facility: HOSPITAL | Age: 63
End: 2018-09-19

## 2018-09-19 DIAGNOSIS — F33.2 SEVERE EPISODE OF RECURRENT MAJOR DEPRESSIVE DISORDER, WITHOUT PSYCHOTIC FEATURES (HCC): Primary | ICD-10-CM

## 2018-09-19 PROCEDURE — S9480 INTENSIVE OUTPATIENT PSYCHIA: HCPCS | Performed by: COUNSELOR

## 2018-09-19 NOTE — PROGRESS NOTES
Wrap-up  Mood at 5  with 10 being the worse      Fatigue or loss of energy  Easily tearing up/crying  An increase/decrease in normal appetite  Changes in normal sleep patterns (increase, decrease, or broken hoours of sleep)  Racing thoughts  No SI  Took Trazodone last night and slept better but reported muscle cramps and felt groggy today.  Appropriate goals for later today.

## 2018-09-19 NOTE — PLAN OF CARE
Problem: BH Patient Care Overview (Adult)  Goal: Interdisciplinary Rounds/Family Conference  Outcome: Ongoing (interventions implemented as appropriate)   09/19/18 1314   Interdisciplinary Rounds/Family Conf   Participants social work     Pt has attended 12 days of IOP dealing with depression and grief/loss issues.  Continues to report several depressive symptoms with most recent focus being on the lack of sleep.  Has processed grief/loss of home, her marriage and having to put her pets up for adoption.  No SI.  Active in classes and groups.  Using positive self-talk,  Reading and mindfulness techniques as positive coping skills.  Planning on moving to [place] late next week and is exploring aftercare in her new home.

## 2018-09-19 NOTE — PROGRESS NOTES
[id number] Psych IOP Class     Class topic: Boundaries     Class participation: good. pt actively engaged and shared personal experience with the group
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
## 2018-09-20 ENCOUNTER — DOCUMENTATION (OUTPATIENT)
Dept: PSYCHIATRY | Facility: HOSPITAL | Age: 63
End: 2018-09-20

## 2018-09-20 ENCOUNTER — OFFICE VISIT (OUTPATIENT)
Dept: PSYCHIATRY | Facility: HOSPITAL | Age: 63
End: 2018-09-20

## 2018-09-20 DIAGNOSIS — F33.2 SEVERE EPISODE OF RECURRENT MAJOR DEPRESSIVE DISORDER, WITHOUT PSYCHOTIC FEATURES (HCC): Primary | ICD-10-CM

## 2018-09-20 PROCEDURE — S9480 INTENSIVE OUTPATIENT PSYCHIA: HCPCS | Performed by: SOCIAL WORKER

## 2018-09-20 NOTE — PROGRESS NOTES
Teaching Record: Psych IOP Class  Information / activity:  Safety Planning    Good participation, shared her plan to implement her safety plan when she moves to [place] and include her family      Julieta Cooney LCSW

## 2018-09-20 NOTE — PROGRESS NOTES
10:15am-11:30am  Group note:  Patient was attentive and engaged in the group process.  She was more appropriate and focused when sharing and allowed others to finish before jumping in.  Patient is hopeful about the future after her move to [place].  Brother has told her that neighbors are looking forward to meeting her and they are planning a neighborhood party after she arrives.

## 2018-09-20 NOTE — PROGRESS NOTES
Wrap up:  Patient rates her mood at a 5, found listening the most helpful.  Symptoms include trouble with concentration, easily crying, and decreased sleep.  Patient cannot get Ambien filled as she had it filled 8/25 for a 90 day supply, is taking Trazedone even though she states she is allergic to it.  Talked about Melatonin-patient states she cannot sleep without medication.  No SI.  Appropriate goals.

## 2018-09-21 ENCOUNTER — OFFICE VISIT (OUTPATIENT)
Dept: PSYCHIATRY | Facility: HOSPITAL | Age: 63
End: 2018-09-21

## 2018-09-21 DIAGNOSIS — F33.2 SEVERE EPISODE OF RECURRENT MAJOR DEPRESSIVE DISORDER, WITHOUT PSYCHOTIC FEATURES (HCC): Primary | ICD-10-CM

## 2018-09-21 PROCEDURE — S9480 INTENSIVE OUTPATIENT PSYCHIA: HCPCS | Performed by: SOCIAL WORKER

## 2018-09-21 NOTE — PROGRESS NOTES
The patient seems brighter today.  She reports improved sleep and is expressing excitement over her pending move to [place] to live with her brother.  She denies suicidal thinking at this point.  We continue current treatment.

## 2018-09-21 NOTE — PROGRESS NOTES
Teaching Record: Psych IOP Class  Information / activity:  Mindfulness    Good participation, requested more information on topic      [facility] Cooney, [facility]

## 2018-09-21 NOTE — PROGRESS NOTES
"Wrap up:  Patient rates her mood at a 3-4 and found \"being together\" the most helpful.  She says, \"I have come to love this group.\"  Symptoms include:  Trouble with concentration, fatigue, and easily distracted.  No SI.  Appropriate goals.  "

## 2018-09-21 NOTE — PROGRESS NOTES
"10:15am-11:30am  Group note:  Patient requires much less redirection and is more on topic in group discussions.  Stated she did sleep last night with a \"leftover Klonopin\".  Relationship with daughter is improving and she has asked for a family session next week.    "

## 2018-09-24 ENCOUNTER — OFFICE VISIT (OUTPATIENT)
Dept: PSYCHIATRY | Facility: HOSPITAL | Age: 63
End: 2018-09-24

## 2018-09-24 DIAGNOSIS — F33.2 SEVERE EPISODE OF RECURRENT MAJOR DEPRESSIVE DISORDER, WITHOUT PSYCHOTIC FEATURES (HCC): Primary | ICD-10-CM

## 2018-09-24 PROCEDURE — S9480 INTENSIVE OUTPATIENT PSYCHIA: HCPCS | Performed by: SOCIAL WORKER

## 2018-09-24 NOTE — PROGRESS NOTES
[id number] Psych IOP Class     Class topic: Emotional Intelligence     Class participation: pt engaged and shared personal experience

## 2018-09-24 NOTE — PROGRESS NOTES
Wrap up:  Patient rated her mood at a 2, found support from the group the most helpful.  She did not report any symptoms and was happy to report that she was sleeping without perscription medication.  No SI.

## 2018-09-24 NOTE — PROGRESS NOTES
10:15am-11:30am  Group note:  Patient was attentive and engaged in the group process.  She is positive about her upcoming move and is having an easier time cleaning out the house and getting rid of things she does not need.  She shared with the new group members and talked about how much she will miss the group when discharged.

## 2018-09-25 ENCOUNTER — OFFICE VISIT (OUTPATIENT)
Dept: PSYCHIATRY | Facility: HOSPITAL | Age: 63
End: 2018-09-25

## 2018-09-25 DIAGNOSIS — F33.2 SEVERE EPISODE OF RECURRENT MAJOR DEPRESSIVE DISORDER, WITHOUT PSYCHOTIC FEATURES (HCC): Primary | ICD-10-CM

## 2018-09-25 PROCEDURE — S9480 INTENSIVE OUTPATIENT PSYCHIA: HCPCS | Performed by: SOCIAL WORKER

## 2018-09-25 NOTE — PROGRESS NOTES
Teaching Record: Psych IOP  Information / activity: Cognitive Distortions       Good participation, gave appropriate feedback and was supportive to peers      Julieta Cooney, [facility]

## 2018-09-25 NOTE — PROGRESS NOTES
Wrap up:  Patient rates her mood at a 4, found listening to others helpful.  Only symptom stated is fatigue.  No SI.  Appropriate goals.

## 2018-09-25 NOTE — PROGRESS NOTES
10:15am-11:30am  Group note:  Patient continues to make daily progress, more positive about the future.  She will take off to pack tomorrow, return on Thursday with discharge on Friday.  Patient has asked for a session with her daughter prior to discharge.  Patient attentive and engaged in the group process.

## 2018-09-27 ENCOUNTER — OFFICE VISIT (OUTPATIENT)
Dept: PSYCHIATRY | Facility: HOSPITAL | Age: 63
End: 2018-09-27

## 2018-09-27 DIAGNOSIS — F33.2 SEVERE EPISODE OF RECURRENT MAJOR DEPRESSIVE DISORDER, WITHOUT PSYCHOTIC FEATURES (HCC): Primary | ICD-10-CM

## 2018-09-27 PROCEDURE — S9480 INTENSIVE OUTPATIENT PSYCHIA: HCPCS | Performed by: SOCIAL WORKER

## 2018-09-27 NOTE — PROGRESS NOTES
Family session 3:00pm-3:40pm:  Daughter came in to meet with patient.  Patient had thought that her daughter was angry with her, that she resented the fact that [place] was [place] out [place] and she would be left with all the work at the house.  Daughter said she was happy for patient-that she will be around her family and that they can still talk and visit.  Daughter also said she enjoyed the projects around the house.  She did admit to being angry in the beginning because she was at the house by herself.  But when patient started helping and her dad came down to help, it was better.  They seem to be in a good place with each other.

## 2018-09-27 NOTE — PROGRESS NOTES
The patient is moving to [place] on Sunday.  Tomorrow.  Her last day in programming.  She appears much brighter when seen today and reports no suicidal ideation.  She reports that her experience in the intensive outpatient program has been a positive one.

## 2018-09-27 NOTE — PROGRESS NOTES
[id number] Psych IOP Class     Class topic: Resilience/Reframing your experience     Class participation: good; pt actively engaged and shared personal experience

## 2018-09-27 NOTE — PROGRESS NOTES
Wrap up:  Patient rates her mood at a 1, found program helpful.  Broken sleep is the only reported symptom and patient states that it is better.  No SI.  Will have a session with daughter later today.

## 2018-09-27 NOTE — PROGRESS NOTES
10:15am-11:30am  Group note:  Patient is attentive and engaged in the group process.  She was supportive of other group members and gave appropriate feedback.  Her ability to reach out to others is a sign of the progress that she has made.

## 2021-03-16 ENCOUNTER — BULK ORDERING (OUTPATIENT)
Dept: CASE MANAGEMENT | Facility: OTHER | Age: 66
End: 2021-03-16

## 2021-03-16 DIAGNOSIS — Z23 IMMUNIZATION DUE: ICD-10-CM
